# Patient Record
(demographics unavailable — no encounter records)

---

## 2025-05-20 NOTE — HISTORY OF PRESENT ILLNESS
[FreeTextEntry1] : 52-year-old female presents to establish care for weight management.  She has noticed increased weight gain over the years, but especially since her 40s when she started to go through menopause.  Her biggest complaint is she is hungry all the time.  Every time that she tries to generate a calorie deficit, she is extremely hungry and thinking about food all the time.  That has gotten much more difficult to lose weight than it did in the past.  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 The [place] where she lives is a quarter of a mile and she intends to start walking that.  She recently also bought a thigh machine and has a yoga mat for sit ups  Nutrition: 24-hour recall Breakfast: Breakfast bar and coffee with half-and-half Lunch: Skinless rotisserie chicken with rice and beans and an avocado salad Dinner: Oatmeal  Fruit: Patient likes most fruits and has them 3 times a week Vegetables: Patient likes these and has been 3-4 times per week Beans: Patient likes them and has a few times a week Whole grains: She does like whole wheat bread and eats oats 2-3 times a week Fish: Left salmon and white fish has once or twice a week Dairy: She has yogurt every day usually, not overdoing cheese, half-and-half in her coffee, but not much Animal protein: Chicken, turkey and beef 2-3 times per week

## 2025-05-20 NOTE — SOCIAL HISTORY
[TextEntry] : Lives alone.  Recently out of a long-term relationship.  Bought her own place.  She has a cat named Phill.  No children. 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

## 2025-05-20 NOTE — ASSESSMENT
[FreeTextEntry1] : Overweight with comorbidity fatty liver and hypertension: Patient has made attempts to lose weight without assistance of medication and is now interested in lifestyle changes alongside medication if appropriate.  Discussed various options.  Phentermine is not appropriate given hypertension.  Contrave is not likely to get her to her goals and she is not much of an emotional eater which is its niche.  The GLP drugs are most effective and likely to be of assistance especially since she says hunger and appetite is a major katie for her to overcome.  Will start Wegovy 0.25 mg weekly.  She will follow-up between the 3rd and 4th dose.     Discussed risks, benefits, and side effects of drugs including, but not limited to: Nausea, vomiting, diarrhea, vomiting, constipation. No personal history of thyroid cancer or pancreatitis. Reports of gallbladder disease, which is common in patients who lose significant weight. Hypersensitivity reactions can occur in 2-5% of patients. Dizziness and fatigue occur in 2-7% of patients.   Discussed need for regular exercise, specifically weight bearing exercise, to mitigate muscle loss.   Discussed need for healthy nutrition. Caloric intake will decrease because of decreased portion sizes, so proper nutrition is all the more important to avoid malnutrition in the face of decreased appetite.   Discussed proper administration of drug.  Demonstration pen shown.  Discussed titration schedule of drug. Explained some patients require titration up 1-2 doses before seeing any effect. Others have notable decreased appetite on the first dose.   Reviewed these drugs are simply a tool to aid in weight loss. Healthy nutrition, portion control, exercise, adequate sleep, stress reduction, avoidance/moderation of risky substances, etc. are all still holder to achieve optimal health.  Spent time reviewing principles of healthy nutrition/healthy dietary patterns. Specifically reviewed lean proteins (skinless poultry, fish, beans, whole grains), low saturated fat options (olive oil, avocado oil, nuts/seeds as opposed to animal fats and tropical oils), whole food carbohydrates (fresh fruit, vegetables, whole grains, beans/legumes) over refined carbohydrates (white flour, white rice, juices, foods with added sugar, etc.), importance of adequate fiber (25-28 g per day for women/35 g per day for men) and risks of low fiber diet, sodium guidelines. Gave patient copy of [employer] Healthy Eating Plate and Mediterranean Diet handout. Also reviewed physical activity, NEAT, and exercise guidelines.  She agreed to the following action plan: I will put through the medication and we will notify you during the prior authorization process.   You would schedule a follow up with me between the third and fourth doses.   Physical Activity   3-5 pound weights  Wednesdays and Fridays  Afternoon - after lunch  Youtube  "ten minutes upper body light weights"  "ten minutes core"  "ten minutes lower body light weights"   Nutrition  Goal - having fish twice a week   Total time spent reviewing records, seeing patient, generating action plan, prescribing medication, documenting visit 73 minutes.